# Patient Record
Sex: MALE | Race: WHITE | URBAN - METROPOLITAN AREA
[De-identification: names, ages, dates, MRNs, and addresses within clinical notes are randomized per-mention and may not be internally consistent; named-entity substitution may affect disease eponyms.]

---

## 2022-09-30 ENCOUNTER — EMERGENCY (EMERGENCY)
Facility: HOSPITAL | Age: 47
LOS: 1 days | Discharge: HOME | End: 2022-09-30
Admitting: EMERGENCY MEDICINE

## 2022-09-30 VITALS
OXYGEN SATURATION: 100 % | HEART RATE: 59 BPM | TEMPERATURE: 98 F | SYSTOLIC BLOOD PRESSURE: 108 MMHG | DIASTOLIC BLOOD PRESSURE: 75 MMHG | RESPIRATION RATE: 18 BRPM | WEIGHT: 139.99 LBS
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
## 2022-09-30 DIAGNOSIS — G35 MULTIPLE SCLEROSIS: ICD-10-CM

## 2022-09-30 DIAGNOSIS — S40.862A INSECT BITE (NONVENOMOUS) OF LEFT UPPER ARM, INITIAL ENCOUNTER: ICD-10-CM
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
## 2022-09-30 DIAGNOSIS — L03.114 CELLULITIS OF LEFT UPPER LIMB: ICD-10-CM

## 2022-09-30 LAB
ANION GAP SERPL CALC-SCNC: 6 MMOL/L — LOW (ref 9–16)
BASOPHILS # BLD AUTO: 0.01 K/UL — SIGNIFICANT CHANGE UP (ref 0–0.2)
BASOPHILS NFR BLD AUTO: 0.3 % — SIGNIFICANT CHANGE UP (ref 0–2)
BUN SERPL-MCNC: 19 MG/DL — SIGNIFICANT CHANGE UP (ref 7–23)
CALCIUM SERPL-MCNC: 9.6 MG/DL — SIGNIFICANT CHANGE UP (ref 8.5–10.5)
CHLORIDE SERPL-SCNC: 101 MMOL/L — SIGNIFICANT CHANGE UP (ref 96–108)
CO2 SERPL-SCNC: 29 MMOL/L — SIGNIFICANT CHANGE UP (ref 22–31)
CREAT SERPL-MCNC: 0.84 MG/DL — SIGNIFICANT CHANGE UP (ref 0.5–1.3)
CRP SERPL-MCNC: <2 MG/L — SIGNIFICANT CHANGE UP (ref 0–9)
EGFR: 109 ML/MIN/1.73M2 — SIGNIFICANT CHANGE UP
EOSINOPHIL # BLD AUTO: 0.06 K/UL — SIGNIFICANT CHANGE UP (ref 0–0.5)
EOSINOPHIL NFR BLD AUTO: 2 % — SIGNIFICANT CHANGE UP (ref 0–6)
GIANT PLATELETS BLD QL SMEAR: PRESENT — SIGNIFICANT CHANGE UP
GLUCOSE SERPL-MCNC: 89 MG/DL — SIGNIFICANT CHANGE UP (ref 70–99)
HCT VFR BLD CALC: 43.4 % — SIGNIFICANT CHANGE UP (ref 39–50)
HGB BLD-MCNC: 15 G/DL — SIGNIFICANT CHANGE UP (ref 13–17)
IMM GRANULOCYTES NFR BLD AUTO: 0.3 % — SIGNIFICANT CHANGE UP (ref 0–0.9)
LACTATE SERPL-SCNC: 0.7 MMOL/L — SIGNIFICANT CHANGE UP (ref 0.4–2)
LG PLATELETS BLD QL AUTO: SIGNIFICANT CHANGE UP
LYMPHOCYTES # BLD AUTO: 1.23 K/UL — SIGNIFICANT CHANGE UP (ref 1–3.3)
LYMPHOCYTES # BLD AUTO: 40.9 % — SIGNIFICANT CHANGE UP (ref 13–44)
MANUAL SMEAR VERIFICATION: SIGNIFICANT CHANGE UP
MCHC RBC-ENTMCNC: 32.2 PG — SIGNIFICANT CHANGE UP (ref 27–34)
MCHC RBC-ENTMCNC: 34.6 GM/DL — SIGNIFICANT CHANGE UP (ref 32–36)
MCV RBC AUTO: 93.1 FL — SIGNIFICANT CHANGE UP (ref 80–100)
MONOCYTES # BLD AUTO: 0.42 K/UL — SIGNIFICANT CHANGE UP (ref 0–0.9)
MONOCYTES NFR BLD AUTO: 14 % — SIGNIFICANT CHANGE UP (ref 2–14)
NEUTROPHILS # BLD AUTO: 1.28 K/UL — LOW (ref 1.8–7.4)
NEUTROPHILS NFR BLD AUTO: 42.5 % — LOW (ref 43–77)
NRBC # BLD: 0 /100 WBCS — SIGNIFICANT CHANGE UP (ref 0–0)
PLAT MORPH BLD: ABNORMAL
PLATELET # BLD AUTO: 135 K/UL — LOW (ref 150–400)
POTASSIUM SERPL-MCNC: 5 MMOL/L — SIGNIFICANT CHANGE UP (ref 3.5–5.3)
POTASSIUM SERPL-SCNC: 5 MMOL/L — SIGNIFICANT CHANGE UP (ref 3.5–5.3)
RBC # BLD: 4.66 M/UL — SIGNIFICANT CHANGE UP (ref 4.2–5.8)
RBC # FLD: 12.3 % — SIGNIFICANT CHANGE UP (ref 10.3–14.5)
RBC BLD AUTO: NORMAL — SIGNIFICANT CHANGE UP
SODIUM SERPL-SCNC: 136 MMOL/L — SIGNIFICANT CHANGE UP (ref 132–145)
WBC # BLD: 3.01 K/UL — LOW (ref 3.8–10.5)
WBC # FLD AUTO: 3.01 K/UL — LOW (ref 3.8–10.5)

## 2022-09-30 PROCEDURE — 99284 EMERGENCY DEPT VISIT MOD MDM: CPT

## 2022-09-30 RX ORDER — CEPHALEXIN 500 MG
1 CAPSULE ORAL
Qty: 28 | Refills: 0
Start: 2022-09-30 | End: 2022-10-06

## 2022-09-30 NOTE — ED PROVIDER NOTE - OBJECTIVE STATEMENT
47 y/o M with PMHx of MS presents c/o insect bite to left upper arm x 1 week. Pt reports he was bitten by something, possibly spider but did not visualize it, while he was in [place] last Friday. Pt states the left upper arm lesion started as a small erythematous papule that has been expanding in redness outwards. Pt reports spreading of the redness cephalad towards the left armpit about 2 days ago. Pt has been taking tylenol as needed and applying hydrocortisone to the area for associated pruritus. [facility] and was referred to ER for evaluation. Pt denies fever/chills, HA, myalgias, cough, cp, sob, oral swelling, abd pain, n/v/d/c, leg swelling. Pt has not been on any abx recently. Denies hx of prior similar rxn or allergic rxns.

## 2022-09-30 NOTE — ED PROVIDER NOTE - PLAN OF CARE
47 y/o M with PMHx of MS presents c/o Left upper arm rash s/p insect bite 2 days ago. Left upper arm with erythema, warmth, streaking up to axilla but well-appearing, afebrile with VSS, no systemic ssx. Labs reassuring with wbc 3, lactate 0.7, crp < 2, cr 0.48, blood cx pending. will provide abx for cellulitis with keflex, bactrim for mrsa coverage. wound outlined. Will provide dermatology referral.    Discussed all results obtained at time of discharge with patient. Pt agrees with plan for discharge and further evaluation with outpatient follow up. Strict return precautions given, patient verbalized understanding and all questions answered. Pt currently stable for discharge.

## 2022-09-30 NOTE — ED PROVIDER NOTE - MUSCULOSKELETAL, MLM
Spine appears normal, range of motion is not limited, no muscle or joint tenderness. B/l UE with FROM and strength 5/5 at level of hands, wrists, elbows and shoulder, intact sensation distally, warm peripherally with cap refill < 2 sec, 2+ radial pulse

## 2022-09-30 NOTE — ED PROVIDER NOTE - PATIENT PORTAL LINK FT
You can access the FollowMyHealth Patient Portal offered by [facility] by registering at the following website: [email or web address]. By joining [facility]’s FollowMyHealth portal, you will also be able to view your health information using other applications (apps) compatible with our system.

## 2022-09-30 NOTE — ED PROVIDER NOTE - NSFOLLOWUPCLINICS_GEN_ALL_ED_FT
[facility]  Dermatology  [address] [place] [id number]  Phone: [phone]  Fax: [phone]  Follow Up Time: 1-3 Days

## 2022-09-30 NOTE — ED PROVIDER NOTE - CLINICAL SUMMARY MEDICAL DECISION MAKING FREE TEXT BOX
47 y/o M with PMHx of MS presents c/o insect bite to left upper arm 1 week ago, not visualized but assuming spider. Pt endorses spreading erythema to left upper arm, now spreading up to axilla last 2 days, associated pruritus and mild warmth, no swelling, decreased ROM, weakness, numbness, tingling. No abx. [facility] and referred to ER. No Fever/chills or systemic ssx. Exam as above- well-appearing in NAD, afebrile and VSS, left upper arm with central erythematous papule that is scabbed over with surrounding erythema and mild warmth, streaking to left axilla but not involving and no adenopathy appreciated, no TTP or crepitus, b/l UE with FROM, NVID. Concern for left upper arm insect bite with possible cellulitis vs unlikely allergic rxn. Plan for labs with cbc, bmp, lactate as screening, obtain blood cx given streaking, likely discharge on PO abx as pt has not failed this yet. Defers pain control. Will outline rash and provide derm referral.

## 2022-09-30 NOTE — ED PROVIDER NOTE - SKIN LOCATION #1
Left upper volar arm with central erythematous papule that is scabbed over with surrounding erythematous papules, blanching, mildly warm, no associated swelling, induration or fluctuance, no TTP or crepitus, +streaking up left arm towards axillar w/o axilla involvement/arm

## 2022-09-30 NOTE — ED ADULT NURSE NOTE - HISTORY OF COVID-19 VACCINATION
[place] [facility] [place]yg[place] V1 V2 V3\\nRight .45\\nLeft .55
Vaccine status unknown

## 2022-09-30 NOTE — ED PROVIDER NOTE - CARE PLAN
Assessment and plan of treatment:	45 y/o M with PMHx of MS presents c/o Left upper arm rash s/p insect bite 2 days ago. Left upper arm with erythema, warmth, streaking up to axilla but well-appearing, afebrile with VSS, no systemic ssx. Labs reassuring with wbc 3, lactate 0.7, crp < 2, cr 0.48, blood cx pending. will provide abx for cellulitis with keflex, bactrim for mrsa coverage. wound outlined. Will provide dermatology referral.    Discussed all results obtained at time of discharge with patient. Pt agrees with plan for discharge and further evaluation with outpatient follow up. Strict return precautions given, patient verbalized understanding and all questions answered. Pt currently stable for discharge.   Principal Discharge DX:	Insect bite  Assessment and plan of treatment:	45 y/o M with PMHx of MS presents c/o Left upper arm rash s/p insect bite 2 days ago. Left upper arm with erythema, warmth, streaking up to axilla but well-appearing, afebrile with VSS, no systemic ssx. Labs reassuring with wbc 3, lactate 0.7, crp < 2, cr 0.48, blood cx pending. will provide abx for cellulitis with keflex, bactrim for mrsa coverage. wound outlined. Will provide dermatology referral.    Discussed all results obtained at time of discharge with patient. Pt agrees with plan for discharge and further evaluation with outpatient follow up. Strict return precautions given, patient verbalized understanding and all questions answered. Pt currently stable for discharge.  Secondary Diagnosis:	Cellulitis   1

## 2022-10-06 LAB
CULTURE RESULTS: SIGNIFICANT CHANGE UP
SPECIMEN SOURCE: SIGNIFICANT CHANGE UP